# Patient Record
Sex: MALE | Race: OTHER | URBAN - METROPOLITAN AREA
[De-identification: names, ages, dates, MRNs, and addresses within clinical notes are randomized per-mention and may not be internally consistent; named-entity substitution may affect disease eponyms.]

---

## 2017-09-08 ENCOUNTER — OFFICE VISIT (OUTPATIENT)
Dept: OTOLARYNGOLOGY | Facility: CLINIC | Age: 42
End: 2017-09-08

## 2017-09-08 VITALS
SYSTOLIC BLOOD PRESSURE: 102 MMHG | WEIGHT: 179 LBS | HEIGHT: 63 IN | DIASTOLIC BLOOD PRESSURE: 70 MMHG | BODY MASS INDEX: 31.71 KG/M2

## 2017-09-08 DIAGNOSIS — H61.23 BILATERAL IMPACTED CERUMEN: Primary | ICD-10-CM

## 2017-09-08 PROCEDURE — 99203 OFFICE O/P NEW LOW 30 MIN: CPT | Performed by: OTOLARYNGOLOGY

## 2017-09-08 PROCEDURE — 99212 OFFICE O/P EST SF 10 MIN: CPT | Performed by: OTOLARYNGOLOGY

## 2017-09-08 RX ORDER — ENALAPRIL MALEATE 5 MG/1
5 TABLET ORAL
Refills: 1 | COMMUNITY
Start: 2017-06-24

## 2017-09-08 NOTE — PATIENT INSTRUCTIONS
Earwax Removal    The ear canal makes earwax from the canal’s lining. The ears make wax to lubricate and protect the ear canal. The ear canal is the tube that connects the middle ear to the outside of the ear.  The wax protects the ear from bacteria, infe · Don’t use cotton swabs in your ears. Cotton swabs may push wax deeper into the ear canal or damage the eardrum.  Use cotton gauze or a wet washcloth  to gently remove wax on the outside of the ear and around the opening to the ear canal.  · Don't use any © 5142-6663 [address], [address] [place]. All rights reserved. This information is not intended as a substitute for professional medical care. Always follow your healthcare professional's instructions.

## 2017-09-08 NOTE — PROGRESS NOTES
Asif Pan is a 40year old male. Patient presents with:  Cerumen Impaction: bilateral    HPI:   E has had intermittent episodes of wax blocking his years. He's had been cleared twice this year.  He feels that here is very well and has no pain Canal - Left: Normal. TM - Right: Normal, Left: Normal. Very large amount of hair blocking the opening of the external auditory meatus on each side. ASSESSMENT AND PLAN:   1.  Bilateral impacted cerumen  He does not have any wax in his ear canals on exa

## 2018-07-09 ENCOUNTER — OFFICE VISIT (OUTPATIENT)
Dept: OTOLARYNGOLOGY | Facility: CLINIC | Age: 43
End: 2018-07-09

## 2018-07-09 VITALS
SYSTOLIC BLOOD PRESSURE: 145 MMHG | BODY MASS INDEX: 32.6 KG/M2 | WEIGHT: 184 LBS | DIASTOLIC BLOOD PRESSURE: 92 MMHG | HEIGHT: 63 IN | TEMPERATURE: 98 F

## 2018-07-09 DIAGNOSIS — K13.70 ORAL LESION: Primary | ICD-10-CM

## 2018-07-09 PROCEDURE — 99212 OFFICE O/P EST SF 10 MIN: CPT | Performed by: OTOLARYNGOLOGY

## 2018-07-09 PROCEDURE — 99213 OFFICE O/P EST LOW 20 MIN: CPT | Performed by: OTOLARYNGOLOGY

## 2018-07-09 RX ORDER — OMEGA-3-ACID ETHYL ESTERS 1 G/1
1 CAPSULE, LIQUID FILLED ORAL DAILY
COMMUNITY

## 2018-07-09 NOTE — PROGRESS NOTES
Marylene Ruffing is a 40year old male. Patient presents with:  Mouth/Lip Problem: lesion on top of mouth for 6-7 wks    HPI:   A few weeks ago he was eating pineapple and had some burning in his mouth. He noticed a whitish area inside his mouth. nerves II through XII grossly intact. Neck Exam Normal Inspection - Normal. Palpation - Normal. Parotid gland - Normal. Thyroid gland - Normal.   Psychiatric Normal Orientation - Oriented to time, place, person & situation. Appropriate mood and affect.

## 2020-10-27 ENCOUNTER — OFFICE VISIT (OUTPATIENT)
Dept: OTOLARYNGOLOGY | Facility: CLINIC | Age: 45
End: 2020-10-27
Payer: COMMERCIAL

## 2020-10-27 VITALS
TEMPERATURE: 98 F | BODY MASS INDEX: 31.54 KG/M2 | SYSTOLIC BLOOD PRESSURE: 133 MMHG | DIASTOLIC BLOOD PRESSURE: 93 MMHG | HEIGHT: 63 IN | WEIGHT: 178 LBS

## 2020-10-27 DIAGNOSIS — K13.70 ORAL LESION: Primary | ICD-10-CM

## 2020-10-27 PROCEDURE — 3008F BODY MASS INDEX DOCD: CPT | Performed by: OTOLARYNGOLOGY

## 2020-10-27 PROCEDURE — 99213 OFFICE O/P EST LOW 20 MIN: CPT | Performed by: OTOLARYNGOLOGY

## 2020-10-27 PROCEDURE — 3080F DIAST BP >= 90 MM HG: CPT | Performed by: OTOLARYNGOLOGY

## 2020-10-27 PROCEDURE — 40808 BIOPSY OF MOUTH LESION: CPT | Performed by: OTOLARYNGOLOGY

## 2020-10-27 PROCEDURE — 3075F SYST BP GE 130 - 139MM HG: CPT | Performed by: OTOLARYNGOLOGY

## 2020-10-27 RX ORDER — FENOFIBRATE 50 MG/1
CAPSULE ORAL
COMMUNITY
Start: 2020-10-26

## 2020-10-28 NOTE — PROGRESS NOTES
Mahesh Wiley is a 41year old male. Patient presents with:  Mouth/Lip Problem: bump/lesion in his mouth for a 5 weeks    HPI:   He has been experiencing a white area in the mouth which has been present for the last several months.   He still mae Palpation - Normal. Parotid gland - Normal. Thyroid gland - Normal.   Psychiatric Normal Orientation - Oriented to time, place, person & situation. Appropriate mood and affect. Lymph Detail Normal Submental. Submandibular.  Anterior cervical. Posterior ce

## (undated) NOTE — LETTER
No referring provider defined for this encounter.        07/09/18        Patient: Renetta Carbajal   YOB: 1975   Date of Visit: 7/9/2018       Dear  Dr. Jorge Putnam MD,      Thank you for referring Renetta Carbajal to my pract

## (undated) NOTE — LETTER
[facility]  [place], [place] [address]       09/08/17        Patient: Milagros Guerrier   YOB: 1975   Date of Visit: 9/8/2017       Dear  Dr. Natividad Regalado MD,      Thank you for referring Milagros Fareed stoll

## (undated) NOTE — LETTER
No referring provider defined for this encounter.        10/28/20        Patient: Sruthi Hameed   YOB: 1975   Date of Visit: 10/27/2020       Dear  Dr. Saige Pitts MD,      Thank you for referring Sruthi Hameed to my pra